# Patient Record
Sex: FEMALE | Race: OTHER | URBAN - METROPOLITAN AREA
[De-identification: names, ages, dates, MRNs, and addresses within clinical notes are randomized per-mention and may not be internally consistent; named-entity substitution may affect disease eponyms.]

---

## 2022-05-27 ENCOUNTER — NEW PATIENT (OUTPATIENT)
Dept: URBAN - METROPOLITAN AREA CLINIC 14 | Facility: CLINIC | Age: 57
End: 2022-05-27

## 2022-05-27 DIAGNOSIS — H02.831: ICD-10-CM

## 2022-05-27 DIAGNOSIS — H02.835: ICD-10-CM

## 2022-05-27 DIAGNOSIS — H02.832: ICD-10-CM

## 2022-05-27 DIAGNOSIS — H02.834: ICD-10-CM

## 2022-05-27 PROCEDURE — 99203 OFFICE O/P NEW LOW 30 MIN: CPT

## 2022-05-27 PROCEDURE — 92285 EXTERNAL OCULAR PHOTOGRAPHY: CPT

## 2022-05-27 ASSESSMENT — VISUAL ACUITY
OD_SC: 20/25
OS_SC: J1+

## 2022-09-09 ENCOUNTER — POST-OP (OUTPATIENT)
Dept: URBAN - METROPOLITAN AREA CLINIC 14 | Facility: CLINIC | Age: 57
End: 2022-09-09

## 2022-09-09 DIAGNOSIS — H02.834: ICD-10-CM

## 2022-09-09 DIAGNOSIS — Z98.890: ICD-10-CM

## 2022-09-09 DIAGNOSIS — H02.831: ICD-10-CM

## 2022-09-09 DIAGNOSIS — L98.8: ICD-10-CM

## 2022-09-09 PROCEDURE — 99024 POSTOP FOLLOW-UP VISIT: CPT

## 2022-09-09 ASSESSMENT — VISUAL ACUITY
OD_SC: 20/25
OS_SC: J1+

## 2022-09-09 NOTE — PATIENT DISCUSSION
Discussed skin care and sun avoidance.  Sunscreen given.  Renewal Lite Facial Lotion given (3 dot) in place of Aquaphor. Follow up in one month.

## 2022-10-04 ENCOUNTER — POST-OP (OUTPATIENT)
Dept: URBAN - NONMETROPOLITAN AREA CLINIC 5 | Facility: CLINIC | Age: 57
End: 2022-10-04

## 2022-10-04 DIAGNOSIS — H02.835: ICD-10-CM

## 2022-10-04 DIAGNOSIS — L98.8: ICD-10-CM

## 2022-10-04 DIAGNOSIS — H02.832: ICD-10-CM

## 2022-10-04 DIAGNOSIS — H02.834: ICD-10-CM

## 2022-10-04 DIAGNOSIS — H02.422: ICD-10-CM

## 2022-10-04 DIAGNOSIS — Z98.890: ICD-10-CM

## 2022-10-04 DIAGNOSIS — H02.831: ICD-10-CM

## 2022-10-04 PROCEDURE — 99024 POSTOP FOLLOW-UP VISIT: CPT

## 2022-10-04 ASSESSMENT — VISUAL ACUITY
OD_SC: 20/25
OS_SC: J1+

## 2023-05-05 ENCOUNTER — ESTABLISHED PATIENT (OUTPATIENT)
Dept: URBAN - METROPOLITAN AREA CLINIC 14 | Facility: CLINIC | Age: 58
End: 2023-05-05

## 2023-05-05 DIAGNOSIS — H02.422: ICD-10-CM

## 2023-05-05 DIAGNOSIS — H02.832: ICD-10-CM

## 2023-05-05 DIAGNOSIS — L98.8: ICD-10-CM

## 2023-05-05 DIAGNOSIS — H01.02A: ICD-10-CM

## 2023-05-05 DIAGNOSIS — H01.02B: ICD-10-CM

## 2023-05-05 DIAGNOSIS — H02.835: ICD-10-CM

## 2023-05-05 DIAGNOSIS — H02.831: ICD-10-CM

## 2023-05-05 DIAGNOSIS — H02.834: ICD-10-CM

## 2023-05-05 DIAGNOSIS — Z98.890: ICD-10-CM

## 2023-05-05 PROCEDURE — 99211NC NO CHARGE VISIT

## 2023-05-05 ASSESSMENT — VISUAL ACUITY
OS_SC: J1+
OD_SC: 20/25